# Patient Record
Sex: FEMALE | Race: WHITE | NOT HISPANIC OR LATINO | Employment: FULL TIME | URBAN - METROPOLITAN AREA
[De-identification: names, ages, dates, MRNs, and addresses within clinical notes are randomized per-mention and may not be internally consistent; named-entity substitution may affect disease eponyms.]

---

## 2024-04-02 NOTE — PROGRESS NOTES
"Chief Complaint  Varicose Veins    Subjective        Brooklyn Mantilla presents to [facility] VASCULAR SURGERY  History of Present Illness the patient is a 63-year-old female with venous insufficiency and varicose veins of the left leg for several years.  She has had injection sclerotherapy in 2018.  She had laser ablation of the left greater saphenous vein on February 13, 2024, by Dr. Fontenot.  Her initial postoperative scan demonstrated successful ablation with no DVT.  She returned on April 3, 2024 with her 6-week follow-up scan.  Scan demonstrated continued ablation of the greater saphenous vein with no DVT.  Her left leg swelling has totally resolved.  Her varices below the knee have decompressed at least 60 to 70%, maybe more.    I am seeing her in Dr. Fontenot's absence.    Past History:  Medical History: has a past medical history of Arthritis, Atrial fibrillation, Back pain, Cancer, Hypertension, and Palpitations.   Surgical History: has a past surgical history that includes Carpal tunnel release; Other surgical history; Colposcopy (04/07/2023); and Colonoscopy.   Family History: family history includes Cancer in her father and mother; Colon cancer in her maternal grandmother; Heart attack (age of onset: 72) in her paternal grandfather; Heart disease (age of onset: 70) in her maternal uncle; Hypertension in her daughter, maternal uncle, and mother; Liver cancer in her father; Multiple sclerosis in her father; Osteoarthritis in her mother; Stroke in her maternal grandmother.   Social History: reports that she has never smoked. She has never used smokeless tobacco. She reports current alcohol use. She reports that she does not use drugs.    (Not in a hospital admission)     Allergies: Patient has no known allergies.   Objective   Vital Signs:  /67   Pulse 60   Ht 175.3 cm (69\")   Wt 68 kg (150 lb)   BMI 22.15 kg/m²   Estimated body mass index is 22.15 kg/m² as calculated from the " "following:    Height as of this encounter: 175.3 cm (69\").    Weight as of this encounter: 68 kg (150 lb).       BMI is within normal parameters. No other follow-up for BMI required.      Physical Exam  Constitutional:       Appearance: Normal appearance.   HENT:      Head: Normocephalic and atraumatic.   Eyes:      Extraocular Movements: Extraocular movements intact.      Pupils: Pupils are equal, round, and reactive to light.   Cardiovascular:      Rate and Rhythm: Normal rate and regular rhythm.   Pulmonary:      Effort: Pulmonary effort is normal.      Breath sounds: Normal breath sounds.   Abdominal:      General: Bowel sounds are normal.      Palpations: Abdomen is soft.   Musculoskeletal:         General: Normal range of motion.      Cervical back: Normal range of motion and neck supple.   Lymphadenopathy:      Cervical: No cervical adenopathy.   Skin:     General: Skin is warm and dry.   Neurological:      General: No focal deficit present.      Mental Status: She is alert.   Psychiatric:         Mood and Affect: Mood normal.         Behavior: Behavior normal.        Result Review :    The following data was reviewed by: Zenia Bueno Jr., MD on 04/03/2024:    Data reviewed : Venous scan of the left leg performed on April 3, 2024, findings as described above.             Assessment and Plan     Diagnoses and all orders for this visit:    1. Varicose veins of leg with pain, left (Primary)    2. Venous (peripheral) insufficiency    She has had a very nice result from her greater saphenous vein ablation, with significant improvement in her swelling, leg heaviness and aching, and marked decompression of her varices.  It is very likely that she will have further decompression with more time.  Maximum medical benefit is generally 4 months or so from her procedures so she should see more improvement.  Will arrange follow-up with Dr. Fontenot in 10 weeks.         Follow Up     Return in about 10 weeks (around " 6/12/2024) for With Dr. Fontenot..  Patient was given instructions and counseling regarding her condition or for health maintenance advice. Please see specific information pulled into the AVS if appropriate.

## 2024-04-03 ENCOUNTER — HOSPITAL ENCOUNTER (OUTPATIENT)
Facility: HOSPITAL | Age: 64
Discharge: HOME | End: 2024-04-03
Admitting: SURGERY
Payer: MEDICARE

## 2024-04-03 ENCOUNTER — OFFICE VISIT (OUTPATIENT)
Age: 64
End: 2024-04-03
Payer: MEDICARE

## 2024-04-03 VITALS
HEART RATE: 60 BPM | HEIGHT: 69 IN | DIASTOLIC BLOOD PRESSURE: 67 MMHG | BODY MASS INDEX: 22.22 KG/M2 | SYSTOLIC BLOOD PRESSURE: 131 MMHG | WEIGHT: 150 LBS

## 2024-04-03 DIAGNOSIS — I83.812 VARICOSE VEINS OF LEFT LOWER EXTREMITY WITH PAIN: ICD-10-CM

## 2024-04-03 DIAGNOSIS — I87.2 VENOUS (PERIPHERAL) INSUFFICIENCY: ICD-10-CM

## 2024-04-03 DIAGNOSIS — I83.812 VARICOSE VEINS OF LEG WITH PAIN, LEFT: Primary | ICD-10-CM

## 2024-04-03 LAB
BH CV LOW VAS LEFT EXTERNAL ILIAC AUGMENT: NORMAL
BH CV LOW VAS LEFT EXTERNAL ILIAC COMPRESS: NORMAL
BH CV LOW VAS LEFT GREAT SAPH AK CM FIELD: 1.09 CM
BH CV LOW VAS LEFT GREATER SAPH AK VESSEL: 1
BH CV LOW VAS LEFT GREATER SAPH BK VESSEL: 1
BH CV LOWER VASCULAR LEFT COMMON FEMORAL AUGMENT: NORMAL
BH CV LOWER VASCULAR LEFT COMMON FEMORAL COMPETENT: NORMAL
BH CV LOWER VASCULAR LEFT COMMON FEMORAL COMPRESS: NORMAL
BH CV LOWER VASCULAR LEFT COMMON FEMORAL PHASIC: NORMAL
BH CV LOWER VASCULAR LEFT COMMON FEMORAL SPONT: NORMAL
BH CV LOWER VASCULAR LEFT DISTAL FEMORAL COMPRESS: NORMAL
BH CV LOWER VASCULAR LEFT EXTERNAL ILIAC COMPETENT: NORMAL
BH CV LOWER VASCULAR LEFT EXTERNAL ILIAC PHASIC: NORMAL
BH CV LOWER VASCULAR LEFT EXTERNAL ILIAC SPONT: NORMAL
BH CV LOWER VASCULAR LEFT GASTRONEMIUS COMPRESS: NORMAL
BH CV LOWER VASCULAR LEFT GREATER SAPH AK COMPRESS: NORMAL
BH CV LOWER VASCULAR LEFT GREATER SAPH BK COMPRESS: NORMAL
BH CV LOWER VASCULAR LEFT LESSER SAPH COMPRESS: NORMAL
BH CV LOWER VASCULAR LEFT MID FEMORAL AUGMENT: NORMAL
BH CV LOWER VASCULAR LEFT MID FEMORAL COMPETENT: NORMAL
BH CV LOWER VASCULAR LEFT MID FEMORAL COMPRESS: NORMAL
BH CV LOWER VASCULAR LEFT MID FEMORAL PHASIC: NORMAL
BH CV LOWER VASCULAR LEFT MID FEMORAL SPONT: NORMAL
BH CV LOWER VASCULAR LEFT PERONEAL COMPRESS: NORMAL
BH CV LOWER VASCULAR LEFT POPLITEAL AUGMENT: NORMAL
BH CV LOWER VASCULAR LEFT POPLITEAL COMPETENT: NORMAL
BH CV LOWER VASCULAR LEFT POPLITEAL COMPRESS: NORMAL
BH CV LOWER VASCULAR LEFT POPLITEAL PHASIC: NORMAL
BH CV LOWER VASCULAR LEFT POPLITEAL SPONT: NORMAL
BH CV LOWER VASCULAR LEFT POSTERIOR TIBIAL COMPRESS: NORMAL
BH CV LOWER VASCULAR LEFT PROFUNDA FEMORAL COMPRESS: NORMAL
BH CV LOWER VASCULAR LEFT PROXIMAL FEMORAL COMPRESS: NORMAL
BH CV LOWER VASCULAR LEFT SAPHENOFEMORAL JUNCTION COMPRESS: NORMAL
BH CV LOWER VASCULAR LEFT SOLEAL COMPRESS: NORMAL
BH CV LOWER VASCULAR RIGHT COMMON FEMORAL AUGMENT: NORMAL
BH CV LOWER VASCULAR RIGHT COMMON FEMORAL COMPETENT: NORMAL
BH CV LOWER VASCULAR RIGHT COMMON FEMORAL COMPRESS: NORMAL
BH CV LOWER VASCULAR RIGHT COMMON FEMORAL PHASIC: NORMAL
BH CV LOWER VASCULAR RIGHT COMMON FEMORAL SPONT: NORMAL

## 2024-04-03 PROCEDURE — 93971 EXTREMITY STUDY: CPT

## 2024-04-10 RX ORDER — PROGESTERONE 100 MG/1
100 CAPSULE ORAL DAILY
Qty: 30 CAPSULE | Refills: 1 | Status: SHIPPED | OUTPATIENT
Start: 2024-04-10

## 2024-04-25 DIAGNOSIS — I10 ESSENTIAL HYPERTENSION: Primary | ICD-10-CM

## 2024-04-25 DIAGNOSIS — Z00.00 HEALTHCARE MAINTENANCE: ICD-10-CM

## 2024-05-01 LAB
ALBUMIN SERPL-MCNC: 4.4 G/DL (ref 3.5–5.2)
ALBUMIN/GLOB SERPL: 2.2 G/DL
ALP SERPL-CCNC: 43 U/L (ref 39–117)
ALT SERPL-CCNC: 24 U/L (ref 1–33)
APPEARANCE UR: CLEAR
AST SERPL-CCNC: 29 U/L (ref 1–32)
BACTERIA #/AREA URNS HPF: ABNORMAL /[HPF]
BACTERIA UR CULT: NO GROWTH
BACTERIA UR CULT: NORMAL
BASOPHILS # BLD AUTO: 0.05 10*3/MM3 (ref 0–0.2)
BASOPHILS NFR BLD AUTO: 1.1 % (ref 0–1.5)
BILIRUB SERPL-MCNC: 0.7 MG/DL (ref 0–1.2)
BILIRUB UR QL STRIP: NEGATIVE
BUN SERPL-MCNC: 14 MG/DL (ref 8–23)
BUN/CREAT SERPL: 16.1 (ref 7–25)
CALCIUM SERPL-MCNC: 9.9 MG/DL (ref 8.6–10.5)
CASTS URNS QL MICRO: ABNORMAL /LPF
CHLORIDE SERPL-SCNC: 102 MMOL/L (ref 98–107)
CHOLEST SERPL-MCNC: 215 MG/DL (ref 0–200)
CO2 SERPL-SCNC: 24.9 MMOL/L (ref 22–29)
COLOR UR: YELLOW
CREAT SERPL-MCNC: 0.87 MG/DL (ref 0.57–1)
EGFRCR SERPLBLD CKD-EPI 2021: 74 ML/MIN/1.73
EOSINOPHIL # BLD AUTO: 0.18 10*3/MM3 (ref 0–0.4)
EOSINOPHIL NFR BLD AUTO: 4.1 % (ref 0.3–6.2)
EPI CELLS #/AREA URNS HPF: ABNORMAL /HPF (ref 0–10)
ERYTHROCYTE [DISTWIDTH] IN BLOOD BY AUTOMATED COUNT: 12.5 % (ref 12.3–15.4)
GLOBULIN SER CALC-MCNC: 2 GM/DL
GLUCOSE SERPL-MCNC: 87 MG/DL (ref 65–99)
GLUCOSE UR QL STRIP: NEGATIVE
HCT VFR BLD AUTO: 38.8 % (ref 34–46.6)
HDLC SERPL-MCNC: 81 MG/DL (ref 40–60)
HGB BLD-MCNC: 12.9 G/DL (ref 12–15.9)
HGB UR QL STRIP: NEGATIVE
IMM GRANULOCYTES # BLD AUTO: 0.01 10*3/MM3 (ref 0–0.05)
IMM GRANULOCYTES NFR BLD AUTO: 0.2 % (ref 0–0.5)
KETONES UR QL STRIP: NEGATIVE
LDLC SERPL CALC-MCNC: 123 MG/DL (ref 0–100)
LEUKOCYTE ESTERASE UR QL STRIP: ABNORMAL
LYMPHOCYTES # BLD AUTO: 2.06 10*3/MM3 (ref 0.7–3.1)
LYMPHOCYTES NFR BLD AUTO: 46.8 % (ref 19.6–45.3)
MCH RBC QN AUTO: 31.9 PG (ref 26.6–33)
MCHC RBC AUTO-ENTMCNC: 33.2 G/DL (ref 31.5–35.7)
MCV RBC AUTO: 95.8 FL (ref 79–97)
MICRO URNS: ABNORMAL
MONOCYTES # BLD AUTO: 0.44 10*3/MM3 (ref 0.1–0.9)
MONOCYTES NFR BLD AUTO: 10 % (ref 5–12)
NEUTROPHILS # BLD AUTO: 1.66 10*3/MM3 (ref 1.7–7)
NEUTROPHILS NFR BLD AUTO: 37.8 % (ref 42.7–76)
NITRITE UR QL STRIP: NEGATIVE
NRBC BLD AUTO-RTO: 0 /100 WBC (ref 0–0.2)
PH UR STRIP: 6.5 [PH] (ref 5–7.5)
PLATELET # BLD AUTO: 262 10*3/MM3 (ref 140–450)
POTASSIUM SERPL-SCNC: 4.8 MMOL/L (ref 3.5–5.2)
PROT SERPL-MCNC: 6.4 G/DL (ref 6–8.5)
PROT UR QL STRIP: NEGATIVE
RBC # BLD AUTO: 4.05 10*6/MM3 (ref 3.77–5.28)
RBC #/AREA URNS HPF: ABNORMAL /HPF (ref 0–2)
SODIUM SERPL-SCNC: 138 MMOL/L (ref 136–145)
SP GR UR STRIP: 1.01 (ref 1–1.03)
TRIGL SERPL-MCNC: 61 MG/DL (ref 0–150)
TSH SERPL DL<=0.005 MIU/L-ACNC: 2.07 UIU/ML (ref 0.27–4.2)
URINALYSIS REFLEX: ABNORMAL
UROBILINOGEN UR STRIP-MCNC: 0.2 MG/DL (ref 0.2–1)
VLDLC SERPL CALC-MCNC: 11 MG/DL (ref 5–40)
WBC # BLD AUTO: 4.4 10*3/MM3 (ref 3.4–10.8)
WBC #/AREA URNS HPF: ABNORMAL /HPF (ref 0–5)

## 2024-05-06 ENCOUNTER — OFFICE VISIT (OUTPATIENT)
Dept: INTERNAL MEDICINE | Facility: CLINIC | Age: 64
End: 2024-05-06
Payer: MEDICARE

## 2024-05-06 VITALS
HEIGHT: 69 IN | DIASTOLIC BLOOD PRESSURE: 54 MMHG | BODY MASS INDEX: 22.07 KG/M2 | SYSTOLIC BLOOD PRESSURE: 112 MMHG | OXYGEN SATURATION: 98 % | HEART RATE: 56 BPM | WEIGHT: 149 LBS

## 2024-05-06 DIAGNOSIS — I48.0 PAROXYSMAL ATRIAL FIBRILLATION: ICD-10-CM

## 2024-05-06 DIAGNOSIS — Z12.31 ENCOUNTER FOR SCREENING MAMMOGRAM FOR BREAST CANCER: ICD-10-CM

## 2024-05-06 DIAGNOSIS — I10 ESSENTIAL HYPERTENSION: ICD-10-CM

## 2024-05-06 DIAGNOSIS — R53.83 FATIGUE, UNSPECIFIED TYPE: ICD-10-CM

## 2024-05-06 DIAGNOSIS — F41.9 ANXIETY: ICD-10-CM

## 2024-05-06 DIAGNOSIS — Z00.00 HEALTHCARE MAINTENANCE: Primary | ICD-10-CM

## 2024-05-06 RX ORDER — ASPIRIN 81 MG/1
81 TABLET ORAL DAILY
Start: 2024-05-06

## 2024-05-06 RX ORDER — ESTRADIOL 0.04 MG/D
1 FILM, EXTENDED RELEASE TRANSDERMAL 2 TIMES WEEKLY
Qty: 8 EACH | Refills: 1 | Status: SHIPPED | OUTPATIENT
Start: 2024-05-06

## 2024-06-18 ENCOUNTER — TELEPHONE (OUTPATIENT)
Dept: INTERNAL MEDICINE | Facility: CLINIC | Age: 64
End: 2024-06-18
Payer: MEDICARE

## 2024-06-18 NOTE — TELEPHONE ENCOUNTER
Caller: Brooklyn Mantilla     Relationship: SELF    Best call back number: [phone]       What is your medical concern?     PATIENT WAS SCHEDULED TO GET A MAMMOGRAM ON 6/17/2024 AT [facility] AND THEY TOLD HER THAT THEY DO NOT TAKE HER INSURANCE    SHE CONTACTED HER INSURANCE COMPANY AND THEY TOLD HER THAT DR AWAN COULD FILE A CONTINUANCE OF CARE ON BEHALF OF THE PATIENT AND IT WOULD COVER THE MAMMOGRAM.    THE PROVIDER  NUMBER WITH HER INSURANCE -[id number].    ONCE THIS HAS BEEN APPROVED THEN PATIENT HAS TO RESCHEDULE HER MAMMOGRAM.      PLEASE ADVISE

## 2024-07-08 RX ORDER — PROGESTERONE 100 MG/1
100 CAPSULE ORAL DAILY
Qty: 90 CAPSULE | Refills: 1 | Status: SHIPPED | OUTPATIENT
Start: 2024-07-08

## 2024-07-15 RX ORDER — ESTRADIOL 0.04 MG/D
1 PATCH, EXTENDED RELEASE TRANSDERMAL 2 TIMES WEEKLY
Qty: 8 EACH | Refills: 1 | Status: SHIPPED | OUTPATIENT
Start: 2024-07-15

## 2024-08-16 RX ORDER — VORTIOXETINE 20 MG/1
1 TABLET, FILM COATED ORAL DAILY
Qty: 90 TABLET | Refills: 3 | Status: SHIPPED | OUTPATIENT
Start: 2024-08-16

## 2024-08-16 RX ORDER — CONJUGATED ESTROGENS 0.62 MG/G
1 CREAM VAGINAL
Qty: 30 G | Refills: 0 | Status: SHIPPED | OUTPATIENT
Start: 2024-08-16

## 2024-08-29 RX ORDER — ESTRADIOL 0.04 MG/D
1 PATCH, EXTENDED RELEASE TRANSDERMAL 2 TIMES WEEKLY
Qty: 8 EACH | Refills: 1 | Status: SHIPPED | OUTPATIENT
Start: 2024-08-29

## 2024-09-03 RX ORDER — ESTRADIOL 0.04 MG/D
1 PATCH, EXTENDED RELEASE TRANSDERMAL 2 TIMES WEEKLY
Qty: 24 EACH | Refills: 1 | Status: SHIPPED | OUTPATIENT
Start: 2024-09-05

## 2024-11-12 ENCOUNTER — TELEPHONE (OUTPATIENT)
Dept: INTERNAL MEDICINE | Facility: CLINIC | Age: 64
End: 2024-11-12
Payer: MEDICARE

## 2024-11-12 RX ORDER — METOPROLOL TARTRATE 25 MG/1
25 TABLET, FILM COATED ORAL 2 TIMES DAILY
Qty: 180 TABLET | Refills: 1 | Status: SHIPPED | OUTPATIENT
Start: 2024-11-12 | End: 2024-11-18 | Stop reason: SDUPTHER

## 2024-11-12 NOTE — TELEPHONE ENCOUNTER
Caller: Jose Rafael Brooklyn    Relationship: Self    Best call back number: [phone]     Requested Prescriptions:   Requested Prescriptions     Pending Prescriptions Disp Refills    metoprolol tartrate (LOPRESSOR) 25 MG tablet 90 tablet 3        Pharmacy where request should be sent: [facility]/PHARMACY #[id number] [facility] [address] AT [address] & [facility] [phone]  - [phone] FX     Last office visit with prescribing clinician: 5/6/2024   Last telemedicine visit with prescribing clinician: Visit date not found   Next office visit with prescribing clinician: 12/6/2024     Additional details provided by patient:     Does the patient have less than a 3 day supply:  [] Yes  [x] No    Would you like a call back once the refill request has been completed: [] Yes [] No    If the office needs to give you a call back, can they leave a voicemail: [] Yes [] No    Rangel Simmons Rep   11/12/24 15:12 EST

## 2024-11-12 NOTE — TELEPHONE ENCOUNTER
Patient request refill for:    metoprolol tartrate (LOPRESSOR) 25 MG tablet     Pharmacy -     [facility]/pharmacy #[id number] - [place], [place] - [address] AT [address]AR [address] - [phone] [facility] [phone] FX     If you have any questions you can call her at [phone].

## 2024-11-14 RX ORDER — OLMESARTAN MEDOXOMIL 40 MG/1
40 TABLET ORAL DAILY
Qty: 90 TABLET | Refills: 3 | Status: SHIPPED | OUTPATIENT
Start: 2024-11-14

## 2024-11-18 RX ORDER — METOPROLOL TARTRATE 25 MG/1
25 TABLET, FILM COATED ORAL 2 TIMES DAILY
Qty: 180 TABLET | Refills: 3 | Status: SHIPPED | OUTPATIENT
Start: 2024-11-18

## 2024-11-21 RX ORDER — HYDROCHLOROTHIAZIDE 25 MG/1
25 TABLET ORAL DAILY
Qty: 90 TABLET | Refills: 3 | Status: SHIPPED | OUTPATIENT
Start: 2024-11-21

## 2024-12-02 DIAGNOSIS — I10 ESSENTIAL HYPERTENSION: Primary | ICD-10-CM

## 2024-12-02 DIAGNOSIS — I48.0 PAROXYSMAL ATRIAL FIBRILLATION: ICD-10-CM

## 2024-12-04 LAB
ALBUMIN SERPL-MCNC: 4.2 G/DL (ref 3.9–4.9)
ALP SERPL-CCNC: 52 IU/L (ref 44–121)
ALT SERPL-CCNC: 30 IU/L (ref 0–32)
APPEARANCE UR: CLEAR
AST SERPL-CCNC: 29 IU/L (ref 0–40)
BACTERIA #/AREA URNS HPF: NORMAL /[HPF]
BACTERIA UR CULT: NORMAL
BACTERIA UR CULT: NORMAL
BASOPHILS # BLD AUTO: 0.1 X10E3/UL (ref 0–0.2)
BASOPHILS NFR BLD AUTO: 1 %
BILIRUB SERPL-MCNC: 0.5 MG/DL (ref 0–1.2)
BILIRUB UR QL STRIP: NEGATIVE
BUN SERPL-MCNC: 14 MG/DL (ref 8–27)
BUN/CREAT SERPL: 18 (ref 12–28)
CALCIUM SERPL-MCNC: 9.7 MG/DL (ref 8.7–10.3)
CASTS URNS QL MICRO: NORMAL /LPF
CHLORIDE SERPL-SCNC: 99 MMOL/L (ref 96–106)
CHOLEST SERPL-MCNC: 204 MG/DL (ref 100–199)
CO2 SERPL-SCNC: 22 MMOL/L (ref 20–29)
COLOR UR: YELLOW
CREAT SERPL-MCNC: 0.79 MG/DL (ref 0.57–1)
EGFRCR SERPLBLD CKD-EPI 2021: 82 ML/MIN/1.73
EOSINOPHIL # BLD AUTO: 0.1 X10E3/UL (ref 0–0.4)
EOSINOPHIL NFR BLD AUTO: 2 %
EPI CELLS #/AREA URNS HPF: NORMAL /HPF (ref 0–10)
ERYTHROCYTE [DISTWIDTH] IN BLOOD BY AUTOMATED COUNT: 11.8 % (ref 11.7–15.4)
GLOBULIN SER CALC-MCNC: 2.3 G/DL (ref 1.5–4.5)
GLUCOSE SERPL-MCNC: 91 MG/DL (ref 70–99)
GLUCOSE UR QL STRIP: NEGATIVE
HCT VFR BLD AUTO: 42.4 % (ref 34–46.6)
HDLC SERPL-MCNC: 74 MG/DL
HGB BLD-MCNC: 13.8 G/DL (ref 11.1–15.9)
HGB UR QL STRIP: NEGATIVE
IMM GRANULOCYTES # BLD AUTO: 0 X10E3/UL (ref 0–0.1)
IMM GRANULOCYTES NFR BLD AUTO: 0 %
KETONES UR QL STRIP: NEGATIVE
LDLC SERPL CALC-MCNC: 115 MG/DL (ref 0–99)
LDLC/HDLC SERPL: 1.6 RATIO (ref 0–3.2)
LEUKOCYTE ESTERASE UR QL STRIP: ABNORMAL
LYMPHOCYTES # BLD AUTO: 2 X10E3/UL (ref 0.7–3.1)
LYMPHOCYTES NFR BLD AUTO: 33 %
MCH RBC QN AUTO: 31.2 PG (ref 26.6–33)
MCHC RBC AUTO-ENTMCNC: 32.5 G/DL (ref 31.5–35.7)
MCV RBC AUTO: 96 FL (ref 79–97)
MICRO URNS: ABNORMAL
MONOCYTES # BLD AUTO: 0.6 X10E3/UL (ref 0.1–0.9)
MONOCYTES NFR BLD AUTO: 9 %
NEUTROPHILS # BLD AUTO: 3.4 X10E3/UL (ref 1.4–7)
NEUTROPHILS NFR BLD AUTO: 55 %
NITRITE UR QL STRIP: NEGATIVE
PH UR STRIP: 6 [PH] (ref 5–7.5)
PLATELET # BLD AUTO: 372 X10E3/UL (ref 150–450)
POTASSIUM SERPL-SCNC: 4.9 MMOL/L (ref 3.5–5.2)
PROT SERPL-MCNC: 6.5 G/DL (ref 6–8.5)
PROT UR QL STRIP: NEGATIVE
RBC # BLD AUTO: 4.43 X10E6/UL (ref 3.77–5.28)
RBC #/AREA URNS HPF: NORMAL /HPF (ref 0–2)
SODIUM SERPL-SCNC: 135 MMOL/L (ref 134–144)
SP GR UR STRIP: 1.01 (ref 1–1.03)
TRIGL SERPL-MCNC: 82 MG/DL (ref 0–149)
TSH SERPL DL<=0.005 MIU/L-ACNC: 2.14 UIU/ML (ref 0.45–4.5)
URINALYSIS REFLEX: ABNORMAL
UROBILINOGEN UR STRIP-MCNC: 0.2 MG/DL (ref 0.2–1)
VLDLC SERPL CALC-MCNC: 15 MG/DL (ref 5–40)
WBC # BLD AUTO: 6.3 X10E3/UL (ref 3.4–10.8)
WBC #/AREA URNS HPF: NORMAL /HPF (ref 0–5)

## 2024-12-05 RX ORDER — HYDROCHLOROTHIAZIDE 25 MG/1
25 TABLET ORAL DAILY
Qty: 90 TABLET | Refills: 3 | Status: SHIPPED | OUTPATIENT
Start: 2024-12-05 | End: 2024-12-06 | Stop reason: DRUGHIGH

## 2024-12-05 RX ORDER — OLMESARTAN MEDOXOMIL 40 MG/1
40 TABLET ORAL DAILY
Qty: 90 TABLET | Refills: 3 | Status: SHIPPED | OUTPATIENT
Start: 2024-12-05

## 2024-12-06 ENCOUNTER — OFFICE VISIT (OUTPATIENT)
Dept: INTERNAL MEDICINE | Facility: CLINIC | Age: 64
End: 2024-12-06
Payer: MEDICARE

## 2024-12-06 VITALS
OXYGEN SATURATION: 99 % | SYSTOLIC BLOOD PRESSURE: 146 MMHG | HEART RATE: 52 BPM | HEIGHT: 69 IN | BODY MASS INDEX: 21.92 KG/M2 | WEIGHT: 148 LBS | DIASTOLIC BLOOD PRESSURE: 72 MMHG

## 2024-12-06 DIAGNOSIS — N93.9 UTERINE BLEEDING: Primary | ICD-10-CM

## 2024-12-06 DIAGNOSIS — I10 HYPERTENSION, UNSPECIFIED TYPE: ICD-10-CM

## 2024-12-06 DIAGNOSIS — N95.1 HOT FLASH, MENOPAUSAL: ICD-10-CM

## 2024-12-06 PROCEDURE — 1125F AMNT PAIN NOTED PAIN PRSNT: CPT | Performed by: INTERNAL MEDICINE

## 2024-12-06 PROCEDURE — 3077F SYST BP >= 140 MM HG: CPT | Performed by: INTERNAL MEDICINE

## 2024-12-06 PROCEDURE — 99214 OFFICE O/P EST MOD 30 MIN: CPT | Performed by: INTERNAL MEDICINE

## 2024-12-06 PROCEDURE — 3078F DIAST BP <80 MM HG: CPT | Performed by: INTERNAL MEDICINE

## 2024-12-06 RX ORDER — VORTIOXETINE 20 MG/1
1 TABLET, FILM COATED ORAL DAILY
Qty: 90 TABLET | Refills: 3 | Status: SHIPPED | OUTPATIENT
Start: 2024-12-06

## 2024-12-06 RX ORDER — HYDROCHLOROTHIAZIDE 12.5 MG/1
12.5 TABLET ORAL DAILY
Qty: 90 TABLET | Refills: 1 | Status: SHIPPED | OUTPATIENT
Start: 2024-12-06

## 2024-12-06 NOTE — PROGRESS NOTES
Chief Complaint   Patient presents with    Hypertension    hot flash/ menopausal symptoms       Hypertension       Brooklyn Mantilla is a 64 y.o. female presents for ER follow up. She was having post menopausal bleeding. She was advised to stop hrt. She is no longer bleeding. She is now having hot flash. She was rx med (maybe veozah) but is cost prohibitive.  She notes mood is stable w trintellix.   Bp elevated today. Patient has reduced hctz to qod or less. Taking ibuprofen for shoulder pain. Had injection 3 months ago w transient benefit. Returned to ortho and received second injection w improvement.     The following portions of the patient's history were reviewed and updated as appropriate: allergies, current medications, past family history, past medical history, past social history, past surgical history and problem list.  Current Outpatient Medications on File Prior to Visit   Medication Sig Dispense Refill    MAGNESIUM PO Take 1 tablet by mouth Daily.      metoprolol tartrate (LOPRESSOR) 25 MG tablet Take 1 tablet by mouth 2 (Two) Times a Day. 180 tablet 3    Multiple Vitamins-Minerals (MULTIVITAMIN ADULT PO) Take 1 tablet by mouth Daily.      olmesartan (BENICAR) 40 MG tablet TAKE 1 TABLET BY MOUTH EVERY DAY 90 tablet 3    Premarin 0.625 MG/GM vaginal cream INSERT 1 G INTO THE VAGINA EVERY 7 (SEVEN) DAYS. 30 g 0    [DISCONTINUED] hydroCHLOROthiazide 25 MG tablet TAKE 1 TABLET BY MOUTH EVERY DAY 90 tablet 3    [DISCONTINUED] Trintellix 20 MG tablet TAKE 1 TABLET BY MOUTH EVERY DAY 90 tablet 3    aspirin 81 MG EC tablet Take 1 tablet by mouth Daily. (Patient not taking: Reported on 12/6/2024)      estradiol (VIVELLE-DOT) 0.0375 MG/24HR patch Place 1 patch on the skin as directed by provider 2 (Two) Times a Week. (Patient not taking: Reported on 12/6/2024) 24 each 1    Progesterone (PROMETRIUM) 100 MG capsule Take 1 capsule by mouth Daily. (Patient not taking: Reported on 12/6/2024) 90 capsule 1     No current  "facility-administered medications on file prior to visit.     Review of Systems   Constitutional: Negative.    HENT: Negative.     Eyes: Negative.    Respiratory: Negative.     Cardiovascular: Negative.    Gastrointestinal: Negative.    Endocrine: Negative.    Genitourinary: Negative.    Musculoskeletal:         Right shoulder pain   Skin: Negative.    Allergic/Immunologic: Negative.    Neurological: Negative.    Hematological: Negative.    Psychiatric/Behavioral: Negative.         Objective   Physical Exam  Vitals and nursing note reviewed.   Constitutional:       Appearance: Normal appearance.   HENT:      Head: Normocephalic and atraumatic.      Right Ear: Tympanic membrane normal.      Left Ear: Tympanic membrane normal.      Nose: Nose normal.      Mouth/Throat:      Mouth: Mucous membranes are moist.   Eyes:      Extraocular Movements: Extraocular movements intact.      Pupils: Pupils are equal, round, and reactive to light.   Cardiovascular:      Rate and Rhythm: Normal rate and regular rhythm.      Pulses: Normal pulses.      Heart sounds: Normal heart sounds.   Pulmonary:      Effort: Pulmonary effort is normal.      Breath sounds: Normal breath sounds.   Abdominal:      General: Abdomen is flat.      Palpations: Abdomen is soft.   Musculoskeletal:      Cervical back: Normal range of motion.   Skin:     General: Skin is warm and dry.   Neurological:      General: No focal deficit present.      Mental Status: She is alert and oriented to person, place, and time.   Psychiatric:         Mood and Affect: Mood normal.         Behavior: Behavior normal.         Thought Content: Thought content normal.         Judgment: Judgment normal.          /72 (BP Location: Right arm, Patient Position: Sitting)   Pulse 52   Ht 174 cm (68.5\")   Wt 67.1 kg (148 lb)   SpO2 99%   BMI 22.18 kg/m²     Assessment & Plan   Diagnoses and all orders for this visit:    Uterine bleeding    Hypertension, unspecified " type    Hot flash, menopausal    Other orders  -     hydroCHLOROthiazide 12.5 MG tablet; Take 1 tablet by mouth Daily.  -     Vortioxetine HBr (Trintellix) 20 MG tablet; Take 1 tablet by mouth Daily.      Patient w uterine bleeding/ now resolved. Will remain off of hrt. She is to f/u w gyn regarding hot flash. Healthy mood on trintellix. Unfortunately, it is cost prohibitive. Will give samples today. Will also give in the future. She will try hctz 12.5 mg daily and take magnesium at night. She will stretch and hydrate well given muscle cramps. She has varicose veins. Healed well s/p varicose vein procedure. She is advised compression tights. She will f/u here in a prn fashion.

## 2025-01-31 NOTE — PROGRESS NOTES
Chief Complaint  No chief complaint on file.  Follow-up after varicose vein surgery    Kobe Mantilla presents to [facility] VASCULAR SURGERY  History of Present Illness  the patient is a 64-year-old female with venous insufficiency and varicose veins of the left leg for several years.  She has had injection sclerotherapy in 2018.  She had laser ablation of the left greater saphenous vein on February 13, 2024, by Dr. Fontenot.  Her initial postoperative scan demonstrated successful ablation with no DVT.  She returned on April 3, 2024 with her 6-week follow-up scan.  Scan demonstrated continued ablation of the greater saphenous vein with no DVT.  Her left leg swelling has totally resolved.  Her varices below the knee have decompressed at least 60 to 70%, maybe more.  She returned on February 5, 2025 for follow-up.  I have reviewed her preoperative venous duplex scan performed on December 18, 2023 (under the name of Brooklyn Cardenas) and she had multiple varices off of the greater saphenous vein below the level of her ablation.  Also noted to have a short segment of anterior accessory saphenous vein with significant reflux.       Past History:  Medical History: has a past medical history of Arthritis, Atrial fibrillation, Back pain, Cancer, Hypertension, and Palpitations.   Surgical History: has a past surgical history that includes Carpal tunnel release; Other surgical history; Colposcopy (04/07/2023); and Colonoscopy.   Family History: family history includes Cancer in her father and mother; Colon cancer in her maternal grandmother; Heart attack (age of onset: 72) in her paternal grandfather; Heart disease (age of onset: 70) in her maternal uncle; Hypertension in her daughter, maternal uncle, and mother; Liver cancer in her father; Multiple sclerosis in her father; Osteoarthritis in her mother; Stroke in her maternal grandmother.   Social History: reports that she has never smoked. She  "has never used smokeless tobacco. She reports current alcohol use. She reports that she does not use drugs.    (Not in a hospital admission)     Allergies: Patient has no known allergies.   Objective   Vital Signs:  There were no vitals taken for this visit.  Estimated body mass index is 22.18 kg/m² as calculated from the following:    Height as of 12/6/24: 174 cm (68.5\").    Weight as of 12/6/24: 67.1 kg (148 lb).     BMI is within normal parameters. No other follow-up for BMI required.    Brooklyn Mantilla  reports that she has never smoked. She has never used smokeless tobacco.       Physical Exam 2 cm varices in the anterior thigh coursing down to the mid thigh.  2 cm varices in the left medial calf to just above the ankle.  Result Review :        Data reviewed : Vein mapping from December 18, 2023 as described above.             Assessment and Plan     Diagnoses and all orders for this visit:    1. Varicose veins of leg with pain, left (Primary)    2. Venous (peripheral) insufficiency    Unfortunately, she continues to have painful varicose veins, those in the anterior thigh originating off of the anterior accessory saphenous vein and varices off of the greater saphenous vein in the calf region.  I have discussed open phlebectomies versus chemical ablation with Varithena.  The procedure and risk and benefits of each of these were discussed.  Risk include but not limited to bleeding, infection, anesthetic risk with the open procedure, DVT, pulmonary embolism, even death.  Even though these risks are small they are real.  She would like to proceed with chemical ablation.  We will contact insurance for approval.         Follow Up     No follow-ups on file.  Patient was given instructions and counseling regarding her condition or for health maintenance advice. Please see specific information pulled into the AVS if appropriate.       "

## 2025-02-05 ENCOUNTER — OFFICE VISIT (OUTPATIENT)
Age: 65
End: 2025-02-05
Payer: MEDICARE

## 2025-02-05 VITALS
HEIGHT: 69 IN | BODY MASS INDEX: 21.77 KG/M2 | HEART RATE: 78 BPM | WEIGHT: 147 LBS | SYSTOLIC BLOOD PRESSURE: 141 MMHG | DIASTOLIC BLOOD PRESSURE: 88 MMHG

## 2025-02-05 DIAGNOSIS — I83.812 VARICOSE VEINS OF LEG WITH PAIN, LEFT: Primary | ICD-10-CM

## 2025-02-05 DIAGNOSIS — I87.2 VENOUS (PERIPHERAL) INSUFFICIENCY: ICD-10-CM

## 2025-02-13 RX ORDER — HYDROCHLOROTHIAZIDE 12.5 MG/1
12.5 TABLET ORAL DAILY
Qty: 100 TABLET | Refills: 2 | Status: SHIPPED | OUTPATIENT
Start: 2025-02-13

## 2025-03-07 ENCOUNTER — TELEPHONE (OUTPATIENT)
Dept: INTERNAL MEDICINE | Facility: CLINIC | Age: 65
End: 2025-03-07
Payer: MEDICARE

## 2025-03-07 NOTE — TELEPHONE ENCOUNTER
Caller: Brooklyn Mantilla    Relationship: Self    Best call back number: [phone]     Which medication are you concerned about:     Vortioxetine HBr (Trintellix) 20 MG tablet       Who prescribed you this medication: DR. AWAN        What are your concerns: MEDICATION HAS GONE UP TO OVER $500 A MONTH.  SHE CAN'T DO A 3 MONTH SUPPLY SO SHE HAS TO PAY THAT EVERY MONTH.  MEDICARE WILL NOT ALLOW TO USE A COUPON AND GOOD RX IS ABOUT THE SAME AS THE $500.  PATIENT CAN'T AFFORD THIS MEDICATION ANY LONGER.  IS THERE ANYTHING TO REPLACE THIS?  SHE HAS 6 DAYS LEFT. PLEASE CALL TO ADVISE.

## 2025-03-09 RX ORDER — VILAZODONE HYDROCHLORIDE 20 MG/1
20 TABLET ORAL DAILY
Qty: 30 TABLET | Refills: 5 | Status: SHIPPED | OUTPATIENT
Start: 2025-03-09

## 2025-03-10 NOTE — TELEPHONE ENCOUNTER
Hub staff attempted to follow warm transfer process and was unsuccessful     Caller: Brooklyn Mantilla    Relationship to patient: Self    Best call back number: [phone]     Patient is needing: PATIENT RETURNING CALL PLACED TO HER TO DISCUSS THIS. PLEASE CALL BACK AT EARLIEST CONVENIENCE.

## 2025-03-10 NOTE — TELEPHONE ENCOUNTER
Called and spoke with patient.   She got our message, she will check the price of the new medicine and check.   Her insurance did tell her, it would cost $500 for one month of Trintillix and then $100 a month after that.   But she will try this new medicine if it's not expensive.

## 2025-04-07 RX ORDER — VILAZODONE HYDROCHLORIDE 20 MG/1
20 TABLET ORAL DAILY
Qty: 90 TABLET | Refills: 3 | Status: SHIPPED | OUTPATIENT
Start: 2025-04-07

## 2025-04-28 NOTE — PROGRESS NOTES
Chief Complaint  Varicose Veins (Post op)    Subjective        Brooklyn Mantilla presents to [facility] VASCULAR SURGERY  HPI   Brooklyn Mantilla is a 64 y.o. female that has been followed in our office for venous insufficiency and varicose veins. She under went a Varithena chemical ablation of left proximal great saphenous vein, proximal anterior saphenous vein, small saphenous vein tributary/extension varicose veins off of the truncal veins on the left lower extremity on 4/25/2025 with Dr. Bueno. She returns today in follow up along with a spot check.  Scan shows successful ablation of the greater saphenous vein, anterior saphenous vein and short saphenous vein with tail of thrombus extending from the saphenofemoral junction into the common femoral vein.  She has been doing well since the procedure with minimal pain. She has been compliant with compression stockings.    Brooklyn Mantilla  reports that she has never smoked. She has never been exposed to tobacco smoke. She has never used smokeless tobacco..        Objective   Vital Signs:  Vitals:    04/29/25 1445   BP: 112/62      Body mass index is 22.02 kg/m².   BMI is within normal parameters. No other follow-up for BMI required.       Physical Exam  Constitutional:       Appearance: Normal appearance.   HENT:      Head: Normocephalic.   Eyes:      Pupils: Pupils are equal, round, and reactive to light.   Cardiovascular:      Rate and Rhythm: Normal rate and regular rhythm.      Pulses:           Dorsalis pedis pulses are 2+ on the left side.        Posterior tibial pulses are 2+ on the left side.   Pulmonary:      Effort: Pulmonary effort is normal.      Breath sounds: Normal breath sounds.   Abdominal:      General: Abdomen is flat.      Palpations: Abdomen is soft.   Musculoskeletal:         General: Normal range of motion.      Right lower leg: No edema.      Left lower leg: No edema.   Skin:     Capillary Refill: Capillary refill takes less than 2  seconds.   Neurological:      Mental Status: She is alert.   Psychiatric:         Mood and Affect: Mood normal.         Behavior: Behavior normal.          Result Review :      Spot Check:   Duplex Venous Lower Extremity - Left CAR (04/29/2025 14:34)                  Assessment and Plan     Diagnoses and all orders for this visit:    1. Venous (peripheral) insufficiency (Primary)  -     Duplex Venous Lower Extremity - Left CAR; Future    2. Varicose veins of left lower extremity with pain  -     Duplex Venous Lower Extremity - Left CAR; Future              Patient is doing well status post Varithena chemical ablation of the left proximal great saphenous vein, proximal anterior saphenous vein, small saphenous vein and tributary/extension varicose veins of the truncal veins in the left lower extremity.  Today scan support successful ablation of the greater saphenous vein, anterior saphenous vein and short saphenous vein.  However, there is a Vernon thrombus extending from the saphenofemoral femoral junction into the common femoral vein.  Therefore, patient was placed on Eliquis 5 mg twice daily x 2 weeks.  She will return in 2 weeks and have venous scan to assess the tail thrombus.  We discussed that it will take time for these veins to decompress.  We discussed postoperative care instructions including wearing stockings for a total of 2 weeks. Shemay resume exercise in 2 weeks.      Follow Up     Return in about 2 weeks (around 5/13/2025) for LEV LLE and follow up .      BINDU Case

## 2025-04-29 ENCOUNTER — HOSPITAL ENCOUNTER (OUTPATIENT)
Facility: HOSPITAL | Age: 65
Discharge: HOME | End: 2025-04-29
Admitting: SURGERY
Payer: MEDICARE

## 2025-04-29 ENCOUNTER — OFFICE VISIT (OUTPATIENT)
Age: 65
End: 2025-04-29
Payer: MEDICARE

## 2025-04-29 VITALS
WEIGHT: 147 LBS | BODY MASS INDEX: 21.77 KG/M2 | DIASTOLIC BLOOD PRESSURE: 62 MMHG | SYSTOLIC BLOOD PRESSURE: 112 MMHG | HEIGHT: 69 IN

## 2025-04-29 DIAGNOSIS — I87.2 VENOUS (PERIPHERAL) INSUFFICIENCY: Primary | ICD-10-CM

## 2025-04-29 DIAGNOSIS — I83.812 VARICOSE VEINS OF LEG WITH PAIN, LEFT: ICD-10-CM

## 2025-04-29 DIAGNOSIS — I87.2 VENOUS (PERIPHERAL) INSUFFICIENCY: ICD-10-CM

## 2025-04-29 DIAGNOSIS — I83.812 VARICOSE VEINS OF LEFT LOWER EXTREMITY WITH PAIN: ICD-10-CM

## 2025-04-29 LAB
BH CV LOW VAS LEFT EXTERNAL ILIAC COMPRESS: NORMAL
BH CV LOW VAS LEFT EXTERNAL ILIAC SPONT: 1
BH CV LOW VAS LEFT GASTRONEMIUS VESSEL: 1
BH CV LOW VAS LEFT GREATER SAPH AK VESSEL: 1
BH CV LOW VAS LEFT LESSER SAPH VESSEL: 1
BH CV LOW VAS LEFT SAPHENOFEMORAL JUNCTION SPONT: 1
BH CV LOW VAS LEFT VARICOSITY AK VESSEL: 1
BH CV LOW VAS LEFT VARICOSITY BK VESSEL: 1
BH CV LOWER VASCULAR LEFT ANTERIOR SAPH COMPRESS: NORMAL
BH CV LOWER VASCULAR LEFT ANTERIOR SAPH VESSEL SCRIPT: 1
BH CV LOWER VASCULAR LEFT COMMON FEMORAL COMPRESS: NORMAL
BH CV LOWER VASCULAR LEFT DISTAL FEMORAL COMPRESS: NORMAL
BH CV LOWER VASCULAR LEFT GASTRONEMIUS COMPRESS: NORMAL
BH CV LOWER VASCULAR LEFT GASTRONEMIUS THROMBUS: NORMAL
BH CV LOWER VASCULAR LEFT GREATER SAPH AK COMPRESS: NORMAL
BH CV LOWER VASCULAR LEFT LESSER SAPH COMPRESS: NORMAL
BH CV LOWER VASCULAR LEFT MID FEMORAL COMPRESS: NORMAL
BH CV LOWER VASCULAR LEFT POPLITEAL COMPRESS: NORMAL
BH CV LOWER VASCULAR LEFT PROFUNDA FEMORAL COMPRESS: NORMAL
BH CV LOWER VASCULAR LEFT PROXIMAL FEMORAL COMPRESS: NORMAL
BH CV LOWER VASCULAR LEFT SAPHENOFEMORAL JUNCTION COMPRESS: NORMAL
BH CV LOWER VASCULAR LEFT VARICOSITY AK COMPRESS: NORMAL
BH CV LOWER VASCULAR LEFT VARICOSITY BK COMPRESS: NORMAL
BH CV LOWER VASCULAR RIGHT COMMON FEMORAL AUGMENT: NORMAL
BH CV LOWER VASCULAR RIGHT COMMON FEMORAL COMPETENT: NORMAL
BH CV LOWER VASCULAR RIGHT COMMON FEMORAL COMPRESS: NORMAL
BH CV LOWER VASCULAR RIGHT COMMON FEMORAL PHASIC: NORMAL
BH CV LOWER VASCULAR RIGHT COMMON FEMORAL SPONT: NORMAL

## 2025-04-29 PROCEDURE — 99213 OFFICE O/P EST LOW 20 MIN: CPT | Performed by: NURSE PRACTITIONER

## 2025-04-29 PROCEDURE — 3074F SYST BP LT 130 MM HG: CPT | Performed by: NURSE PRACTITIONER

## 2025-04-29 PROCEDURE — 93971 EXTREMITY STUDY: CPT

## 2025-04-29 PROCEDURE — 93971 EXTREMITY STUDY: CPT | Performed by: SURGERY

## 2025-04-29 PROCEDURE — 3078F DIAST BP <80 MM HG: CPT | Performed by: NURSE PRACTITIONER

## 2025-05-12 ENCOUNTER — OFFICE VISIT (OUTPATIENT)
Age: 65
End: 2025-05-12
Payer: MEDICARE

## 2025-05-12 ENCOUNTER — HOSPITAL ENCOUNTER (OUTPATIENT)
Facility: HOSPITAL | Age: 65
Discharge: HOME | End: 2025-05-12
Admitting: NURSE PRACTITIONER
Payer: MEDICARE

## 2025-05-12 VITALS
SYSTOLIC BLOOD PRESSURE: 118 MMHG | HEIGHT: 69 IN | DIASTOLIC BLOOD PRESSURE: 70 MMHG | WEIGHT: 147 LBS | BODY MASS INDEX: 21.77 KG/M2

## 2025-05-12 DIAGNOSIS — I87.2 VENOUS (PERIPHERAL) INSUFFICIENCY: ICD-10-CM

## 2025-05-12 DIAGNOSIS — I83.812 VARICOSE VEINS OF LEFT LOWER EXTREMITY WITH PAIN: ICD-10-CM

## 2025-05-12 DIAGNOSIS — I82.412 DEEP VEIN THROMBOSIS (DVT) OF FEMORAL VEIN OF LEFT LOWER EXTREMITY, UNSPECIFIED CHRONICITY: Primary | ICD-10-CM

## 2025-05-12 PROCEDURE — 93971 EXTREMITY STUDY: CPT

## 2025-05-12 RX ORDER — VORTIOXETINE 20 MG/1
1 TABLET, FILM COATED ORAL DAILY
COMMUNITY
Start: 2025-04-30

## 2025-05-12 RX ORDER — ESTRADIOL 0.1 MG/G
CREAM VAGINAL
COMMUNITY
Start: 2025-01-28

## 2025-05-13 LAB
BH CV LOW VAS LEFT EXTERNAL ILIAC COMPRESS: NORMAL
BH CV LOW VAS LEFT GREATER SAPH AK VESSEL: 1
BH CV LOW VAS LEFT GREATER SAPH BK VESSEL: 1
BH CV LOW VAS LEFT LESSER SAPH VESSEL: 1
BH CV LOW VAS LEFT SAPHENOFEMORAL JUNCTION SPONT: 1
BH CV LOW VAS LEFT VARICOSITY BK VESSEL: 1
BH CV LOWER VASCULAR LEFT ANTERIOR SAPH COMPRESS: NORMAL
BH CV LOWER VASCULAR LEFT ANTERIOR SAPH VESSEL SCRIPT: 1
BH CV LOWER VASCULAR LEFT COMMON FEMORAL COMPRESS: NORMAL
BH CV LOWER VASCULAR LEFT DISTAL FEMORAL COMPRESS: NORMAL
BH CV LOWER VASCULAR LEFT GASTRONEMIUS COMPRESS: NORMAL
BH CV LOWER VASCULAR LEFT GREATER SAPH AK COMPRESS: NORMAL
BH CV LOWER VASCULAR LEFT GREATER SAPH BK COMPRESS: NORMAL
BH CV LOWER VASCULAR LEFT LESSER SAPH COMPRESS: NORMAL
BH CV LOWER VASCULAR LEFT MID FEMORAL COMPRESS: NORMAL
BH CV LOWER VASCULAR LEFT PERFORATOR COMPRESS: NORMAL
BH CV LOWER VASCULAR LEFT PERFORATOR THROMBUS: NORMAL
BH CV LOWER VASCULAR LEFT PERFORATOR VESSEL: 1
BH CV LOWER VASCULAR LEFT PERONEAL COMPRESS: NORMAL
BH CV LOWER VASCULAR LEFT POPLITEAL COMPRESS: NORMAL
BH CV LOWER VASCULAR LEFT POSTERIOR TIBIAL COMPRESS: NORMAL
BH CV LOWER VASCULAR LEFT PROFUNDA FEMORAL COMPRESS: NORMAL
BH CV LOWER VASCULAR LEFT PROXIMAL FEMORAL COMPRESS: NORMAL
BH CV LOWER VASCULAR LEFT SAPHENOFEMORAL JUNCTION COMPRESS: NORMAL
BH CV LOWER VASCULAR LEFT VARICOSITY BK COMPRESS: NORMAL
BH CV LOWER VASCULAR LEFT VARICOSITY BK THROMBUS: NORMAL

## 2025-05-13 NOTE — PROGRESS NOTES
Chief Complaint  POST OP (Tail thrombus) and Deep Vein Thrombosis    Subjective        Brooklyn Mantilla presents to [facility] VASCULAR SURGERY  HPI   Brooklyn Mantilla is a 64 y.o. female that has been followed in our office for venous insufficiency and varicose veins. She under went a Varithena chemical ablation of left proximal great saphenous vein, proximal anterior saphenous vein, small saphenous vein tributary/extension varicose veins off of the truncal veins on the left lower extremity on 4/25/2025 with Dr. Bueno. She returns today in follow up along with a spot check due to tail of thrombus extending from the saphenofemoral junction into the common femoral vein.  Today's scan shows successful closure of the left anterior saphenous vein, greater saphenous vein, short saphenous vein with detail at the saphenofemoral junction flush with the common femoral vein.  No extension into the deep system.  Branch varices and  with closure as well.  All other left sided veins appear to be normal.  She has been doing well since her last visit without any pain.  She has been compliant with compression stockings and using her Eliquis states that she has 2 pills left.  No complaints of any chest pain shortness of breath.    Brooklyn Mantilla  reports that she has never smoked. She has never been exposed to tobacco smoke. She has never used smokeless tobacco..        Objective   Vital Signs:  Vitals:    05/12/25 1601   BP: 118/70      Body mass index is 22.02 kg/m².   BMI is within normal parameters. No other follow-up for BMI required.       Physical Exam  Constitutional:       Appearance: Normal appearance.   HENT:      Head: Normocephalic.   Eyes:      Pupils: Pupils are equal, round, and reactive to light.   Cardiovascular:      Rate and Rhythm: Normal rate and regular rhythm.      Pulses:           Dorsalis pedis pulses are 2+ on the left side.        Posterior tibial pulses are 2+ on the left side.    Pulmonary:      Effort: Pulmonary effort is normal.      Breath sounds: Normal breath sounds.   Abdominal:      General: Abdomen is flat.      Palpations: Abdomen is soft.   Musculoskeletal:         General: Normal range of motion.      Right lower leg: No edema.      Left lower leg: No edema.   Skin:     Capillary Refill: Capillary refill takes less than 2 seconds.   Neurological:      Mental Status: She is alert.   Psychiatric:         Mood and Affect: Mood normal.         Behavior: Behavior normal.          Result Review :      Spot Check: Duplex Venous Lower Extremity - Left CAR (05/12/2025 16:00)          Assessment and Plan     Diagnoses and all orders for this visit:    1. Deep vein thrombosis (DVT) of femoral vein of left lower extremity, unspecified chronicity (Primary)    2. Venous (peripheral) insufficiency  -     Duplex Venous Lower Extremity - Left CAR; Future  -     Duplex Venous Lower Extremity - Left CAR; Future    3. Varicose veins of left lower extremity with pain  -     Duplex Venous Lower Extremity - Left CAR; Future  -     Duplex Venous Lower Extremity - Left CAR; Future              Patient is doing well status post Varithena chemical ablation of the left proximal great saphenous vein, proximal anterior saphenous vein, small saphenous vein and tributary/extension varicose veins of the truncal veins in the left lower extremity.  Today's scan support that the patella that was extending into the saphenofemoral junction is now resolved.  We stopped the Eliquis.  We will plan to follow-up in 12 weeks with a repeat venous duplex and to see the surgeon. She instructed to use her compression hoses as needed such as when traveling and days in which she feels like she will be on legs more than usually.  She will follow up if she has increase swelling and/or pain.  Follow Up     Return in about 3 months (around 8/12/2025) for Left LEV and f/u with [facility].      BINDU Case

## 2025-05-14 DIAGNOSIS — I10 ESSENTIAL HYPERTENSION: Primary | ICD-10-CM

## 2025-05-14 DIAGNOSIS — Z13.220 LIPID SCREENING: ICD-10-CM

## 2025-05-14 DIAGNOSIS — Z13.29 THYROID DISORDER SCREENING: ICD-10-CM

## 2025-06-07 LAB
ALBUMIN SERPL-MCNC: 4.7 G/DL (ref 3.5–5.2)
ALBUMIN/GLOB SERPL: 2 G/DL
ALP SERPL-CCNC: 63 U/L (ref 39–117)
ALT SERPL-CCNC: 30 U/L (ref 1–33)
APPEARANCE UR: CLEAR
AST SERPL-CCNC: 41 U/L (ref 1–32)
BACTERIA #/AREA URNS HPF: ABNORMAL /[HPF]
BACTERIA UR CULT: NO GROWTH
BACTERIA UR CULT: NORMAL
BASOPHILS # BLD AUTO: 0.06 10*3/MM3 (ref 0–0.2)
BASOPHILS NFR BLD AUTO: 1.3 % (ref 0–1.5)
BILIRUB SERPL-MCNC: 0.7 MG/DL (ref 0–1.2)
BILIRUB UR QL STRIP: NEGATIVE
BUN SERPL-MCNC: 13 MG/DL (ref 8–23)
BUN/CREAT SERPL: 16 (ref 7–25)
CALCIUM SERPL-MCNC: 10.2 MG/DL (ref 8.6–10.5)
CASTS URNS QL MICRO: ABNORMAL /LPF
CHLORIDE SERPL-SCNC: 102 MMOL/L (ref 98–107)
CHOLEST SERPL-MCNC: 211 MG/DL (ref 0–200)
CO2 SERPL-SCNC: 25.9 MMOL/L (ref 22–29)
COLOR UR: YELLOW
CREAT SERPL-MCNC: 0.81 MG/DL (ref 0.57–1)
EGFRCR SERPLBLD CKD-EPI 2021: 79.7 ML/MIN/1.73
EOSINOPHIL # BLD AUTO: 0.14 10*3/MM3 (ref 0–0.4)
EOSINOPHIL NFR BLD AUTO: 3 % (ref 0.3–6.2)
EPI CELLS #/AREA URNS HPF: ABNORMAL /HPF (ref 0–10)
ERYTHROCYTE [DISTWIDTH] IN BLOOD BY AUTOMATED COUNT: 12.6 % (ref 12.3–15.4)
GLOBULIN SER CALC-MCNC: 2.3 GM/DL
GLUCOSE SERPL-MCNC: 86 MG/DL (ref 65–99)
GLUCOSE UR QL STRIP: NEGATIVE
HCT VFR BLD AUTO: 37.1 % (ref 34–46.6)
HDLC SERPL-MCNC: 81 MG/DL (ref 40–60)
HGB BLD-MCNC: 12.5 G/DL (ref 12–15.9)
HGB UR QL STRIP: NEGATIVE
IMM GRANULOCYTES # BLD AUTO: 0.01 10*3/MM3 (ref 0–0.05)
IMM GRANULOCYTES NFR BLD AUTO: 0.2 % (ref 0–0.5)
KETONES UR QL STRIP: NEGATIVE
LDLC SERPL CALC-MCNC: 117 MG/DL (ref 0–100)
LEUKOCYTE ESTERASE UR QL STRIP: ABNORMAL
LYMPHOCYTES # BLD AUTO: 2.2 10*3/MM3 (ref 0.7–3.1)
LYMPHOCYTES NFR BLD AUTO: 47.8 % (ref 19.6–45.3)
MCH RBC QN AUTO: 32 PG (ref 26.6–33)
MCHC RBC AUTO-ENTMCNC: 33.7 G/DL (ref 31.5–35.7)
MCV RBC AUTO: 94.9 FL (ref 79–97)
MICRO URNS: ABNORMAL
MONOCYTES # BLD AUTO: 0.42 10*3/MM3 (ref 0.1–0.9)
MONOCYTES NFR BLD AUTO: 9.1 % (ref 5–12)
MUCOUS THREADS URNS QL MICRO: PRESENT
NEUTROPHILS # BLD AUTO: 1.77 10*3/MM3 (ref 1.7–7)
NEUTROPHILS NFR BLD AUTO: 38.6 % (ref 42.7–76)
NITRITE UR QL STRIP: NEGATIVE
NRBC BLD AUTO-RTO: 0 /100 WBC (ref 0–0.2)
PH UR STRIP: 7 [PH] (ref 5–7.5)
PLATELET # BLD AUTO: 287 10*3/MM3 (ref 140–450)
POTASSIUM SERPL-SCNC: 4.8 MMOL/L (ref 3.5–5.2)
PROT SERPL-MCNC: 7 G/DL (ref 6–8.5)
PROT UR QL STRIP: NEGATIVE
RBC # BLD AUTO: 3.91 10*6/MM3 (ref 3.77–5.28)
RBC #/AREA URNS HPF: ABNORMAL /HPF (ref 0–2)
SODIUM SERPL-SCNC: 138 MMOL/L (ref 136–145)
SP GR UR STRIP: 1.01 (ref 1–1.03)
TRIGL SERPL-MCNC: 74 MG/DL (ref 0–150)
TSH SERPL DL<=0.005 MIU/L-ACNC: 1.86 UIU/ML (ref 0.27–4.2)
URINALYSIS REFLEX: ABNORMAL
UROBILINOGEN UR STRIP-MCNC: 0.2 MG/DL (ref 0.2–1)
VLDLC SERPL CALC-MCNC: 13 MG/DL (ref 5–40)
WBC # BLD AUTO: 4.6 10*3/MM3 (ref 3.4–10.8)
WBC #/AREA URNS HPF: ABNORMAL /HPF (ref 0–5)

## 2025-06-08 ENCOUNTER — RESULTS FOLLOW-UP (OUTPATIENT)
Dept: INTERNAL MEDICINE | Facility: CLINIC | Age: 65
End: 2025-06-08
Payer: MEDICARE

## 2025-06-09 NOTE — TELEPHONE ENCOUNTER
Slightly elevated liver enzyme. Would advise avoiding etoh and liver toxic meds.will repeat level at her follow up visit.   ANISA

## 2025-06-10 ENCOUNTER — OFFICE VISIT (OUTPATIENT)
Dept: INTERNAL MEDICINE | Facility: CLINIC | Age: 65
End: 2025-06-10
Payer: MEDICARE

## 2025-06-10 VITALS
HEIGHT: 69 IN | OXYGEN SATURATION: 99 % | HEART RATE: 51 BPM | BODY MASS INDEX: 22.22 KG/M2 | WEIGHT: 150 LBS | DIASTOLIC BLOOD PRESSURE: 74 MMHG | SYSTOLIC BLOOD PRESSURE: 136 MMHG

## 2025-06-10 DIAGNOSIS — R74.01 ELEVATED AST (SGOT): ICD-10-CM

## 2025-06-10 DIAGNOSIS — Z00.00 HEALTHCARE MAINTENANCE: Primary | ICD-10-CM

## 2025-06-10 DIAGNOSIS — R00.2 PALPITATION: ICD-10-CM

## 2025-06-10 DIAGNOSIS — R35.0 URINE FREQUENCY: ICD-10-CM

## 2025-06-10 DIAGNOSIS — I48.0 PAROXYSMAL ATRIAL FIBRILLATION: ICD-10-CM

## 2025-06-10 DIAGNOSIS — I10 ESSENTIAL HYPERTENSION: ICD-10-CM

## 2025-06-10 DIAGNOSIS — I36.1 NONRHEUMATIC TRICUSPID VALVE REGURGITATION: ICD-10-CM

## 2025-06-10 PROCEDURE — 3075F SYST BP GE 130 - 139MM HG: CPT | Performed by: INTERNAL MEDICINE

## 2025-06-10 PROCEDURE — 1170F FXNL STATUS ASSESSED: CPT | Performed by: INTERNAL MEDICINE

## 2025-06-10 PROCEDURE — 1125F AMNT PAIN NOTED PAIN PRSNT: CPT | Performed by: INTERNAL MEDICINE

## 2025-06-10 PROCEDURE — 93000 ELECTROCARDIOGRAM COMPLETE: CPT | Performed by: INTERNAL MEDICINE

## 2025-06-10 PROCEDURE — G0439 PPPS, SUBSEQ VISIT: HCPCS | Performed by: INTERNAL MEDICINE

## 2025-06-10 PROCEDURE — 3078F DIAST BP <80 MM HG: CPT | Performed by: INTERNAL MEDICINE

## 2025-06-10 NOTE — PROGRESS NOTES
Subjective   The ABCs of the Annual Wellness Visit  Medicare Wellness Visit      Brooklyn Mantilla is a 65 y.o. patient who presents for a Medicare Wellness Visit.    The following portions of the patient's history were reviewed and   updated as appropriate: allergies, current medications, past family history, past medical history, past social history, past surgical history, and problem list.    Compared to one year ago, the patient's physical   health is the same.  Compared to one year ago, the patient's mental   health is the same.    Recent Hospitalizations:  She was not admitted to the hospital during the last year.     Current Medical Providers:  Patient Care Team:  Cathy Pina MD as PCP - General (Internal Medicine)  Hannah Kelley MD as Consulting Physician (Obstetrics and Gynecology)    Outpatient Medications Prior to Visit   Medication Sig Dispense Refill    estradiol (ESTRACE) 0.1 MG/GM vaginal cream INSERT 1 APPLICATORFUL VAGINALLY TWICE WEEKLY      hydroCHLOROthiazide 12.5 MG tablet TAKE 1 TABLET BY MOUTH DAILY 100 tablet 2    MAGNESIUM PO Take 1 tablet by mouth Daily.      metoprolol tartrate (LOPRESSOR) 25 MG tablet Take 1 tablet by mouth 2 (Two) Times a Day. 180 tablet 3    Multiple Vitamins-Minerals (MULTIVITAMIN ADULT PO) Take 1 tablet by mouth Daily.      olmesartan (BENICAR) 40 MG tablet TAKE 1 TABLET BY MOUTH EVERY DAY 90 tablet 3    Trintellix 20 MG tablet Take 1 tablet by mouth Daily.      Premarin 0.625 MG/GM vaginal cream INSERT 1 G INTO THE VAGINA EVERY 7 (SEVEN) DAYS. 30 g 0    apixaban (ELIQUIS) 5 MG tablet tablet Take 1 tablet by mouth 2 (Two) Times a Day. (Patient not taking: Reported on 6/10/2025)      vilazodone (Viibryd) 20 MG tablet tablet Take 1 tablet by mouth Daily. (Patient not taking: Reported on 6/10/2025) 90 tablet 3     No facility-administered medications prior to visit.     No opioid medication identified on active medication list. I have reviewed chart for other  "potential  high risk medication/s and harmful drug interactions in the elderly.      Aspirin is not on active medication list.  Aspirin use is not indicated based on review of current medical condition/s. Risk of harm outweighs potential benefits.  .    Patient Active Problem List   Diagnosis    Essential hypertension    Healthcare maintenance    Palpitations    Metatarsalgia of left foot    Hallux valgus of left foot    Hammer toe of left foot    Arthritis of first metatarsophalangeal (MTP) joint of left foot    Bursitis of intermetatarsal bursa of left foot    Venous (peripheral) insufficiency    Varicose veins of left lower extremity with pain     Advance Care Planning Advance Directive is not on file.  ACP discussion was held with the patient during this visit. Patient does not have an advance directive, information provided.            Objective   Vitals:    06/10/25 1402   BP: 136/74   Pulse: 51   SpO2: 99%   Weight: 68 kg (150 lb)   Height: 174 cm (68.5\")   PainSc: 2        Estimated body mass index is 22.48 kg/m² as calculated from the following:    Height as of this encounter: 174 cm (68.5\").    Weight as of this encounter: 68 kg (150 lb).    BMI is within normal parameters. No other follow-up for BMI required.           Does the patient have evidence of cognitive impairment? No  Lab Results   Component Value Date    CHLPL 211 (H) 06/05/2025    TRIG 74 06/05/2025    HDL 81 (H) 06/05/2025     (H) 06/05/2025    VLDL 13 06/05/2025                                                                                                Health  Risk Assessment    Smoking Status:  Social History     Tobacco Use   Smoking Status Never    Passive exposure: Never   Smokeless Tobacco Never   Tobacco Comments    Daily caffeine use     Alcohol Consumption:  Social History     Substance and Sexual Activity   Alcohol Use Yes    Comment: 4-5 times a week       Fall Risk Screen  STEADI Fall Risk Assessment was completed, and " patient is at LOW risk for falls.Assessment completed on:6/10/2025    Depression Screening   Little interest or pleasure in doing things? Not at all   Feeling down, depressed, or hopeless? Not at all   PHQ-2 Total Score 0      Health Habits and Functional and Cognitive Screenin/10/2025     2:06 PM   Functional & Cognitive Status   Do you have difficulty preparing food and eating? No   Do you have difficulty bathing yourself, getting dressed or grooming yourself? No   Do you have difficulty using the toilet? No   Do you have difficulty moving around from place to place? No   Do you have trouble with steps or getting out of a bed or a chair? No   Current Diet Well Balanced Diet   Dental Exam Up to date   Eye Exam Up to date   Do you need help using the phone?  No   Are you deaf or do you have serious difficulty hearing?  No   Do you need help to go to places out of walking distance? No   Do you need help shopping? No   Do you need help preparing meals?  No   Do you need help with housework?  No   Do you need help with laundry? No   Do you need help taking your medications? No   Do you need help managing money? No   Do you ever drive or ride in a car without wearing a seat belt? No   Have you felt unusual stress, anger or loneliness in the last month? No   Who do you live with? Spouse   If you need help, do you have trouble finding someone available to you? No   Have you been bothered in the last four weeks by sexual problems? No   Do you have difficulty concentrating, remembering or making decisions? No           Age-appropriate Screening Schedule:  Refer to the list below for future screening recommendations based on patient's age, sex and/or medical conditions. Orders for these recommended tests are listed in the plan section. The patient has been provided with a written plan.    Health Maintenance List  Health Maintenance   Topic Date Due    COVID-19 Vaccine (3 - 2024-25 season) 12/10/2025 (Originally  "9/1/2024)    INFLUENZA VACCINE  07/01/2025    DXA SCAN  01/19/2026    ANNUAL WELLNESS VISIT  06/10/2026    MAMMOGRAM  12/10/2026    TDAP/TD VACCINES (3 - Td or Tdap) 10/09/2028    COLORECTAL CANCER SCREENING  02/27/2033    HEPATITIS C SCREENING  Completed    Pneumococcal Vaccine 50+  Completed    ZOSTER VACCINE  Completed                                                                                                                                                CMS Preventative Services Quick Reference  Risk Factors Identified During Encounter  Utd on hcm    The above risks/problems have been discussed with the patient.  Pertinent information has been shared with the patient in the After Visit Summary.  An After Visit Summary and PPPS were made available to the patient.    Follow Up:   Next Medicare Wellness visit to be scheduled in 1 year.         Additional E&M Note during same encounter follows:  Patient has additional, significant, and separately identifiable condition(s)/problem(s) that require work above and beyond the Medicare Wellness Visit     Chief Complaint  Annual Exam  Atrial fibrillation  Hypertension    Subjective   HPI  Brooklyn is also being seen today for an annual adult preventative physical exam.  and Brooklyn is also being seen today for additional medical problem/s.she has htn. Bp at home normotensive. H/o afib. Last visit w cards 2020. Some recent palpitations when playing tennis recently. May have gotten dehydrated. Has nxious and depressed mood. Doing well on trintelix at tthis time. Has a therapist not currently attending.                   Objective   Vital Signs:  /74   Pulse 51   Ht 174 cm (68.5\")   Wt 68 kg (150 lb)   SpO2 99%   BMI 22.48 kg/m²   Physical Exam  Vitals and nursing note reviewed.   Constitutional:       Appearance: She is well-developed.   HENT:      Head: Normocephalic and atraumatic.      Right Ear: External ear normal.      Left Ear: External ear normal.      " Nose: Nose normal.   Eyes:      Pupils: Pupils are equal, round, and reactive to light.   Cardiovascular:      Rate and Rhythm: Normal rate and regular rhythm.      Heart sounds: Normal heart sounds.   Pulmonary:      Effort: Pulmonary effort is normal. No respiratory distress.      Breath sounds: Normal breath sounds.   Abdominal:      Palpations: Abdomen is soft.   Musculoskeletal:         General: Normal range of motion.      Cervical back: Neck supple.   Skin:     General: Skin is warm and dry.   Neurological:      Mental Status: She is alert and oriented to person, place, and time.   Psychiatric:         Mood and Affect: Mood normal.         Behavior: Behavior normal.         Thought Content: Thought content normal.         Judgment: Judgment normal.     EKG for palpitation. Sinus bradycardia. Nonspec st changes. Similar to prior tracings.     The following data was reviewed by: Cathy Pina MD on 06/10/2025:  Data reviewed : Cardiology studies stress and echo  Common labs          9/3/2024    11:14 12/2/2024    09:50 6/5/2025    11:19   Common Labs   Glucose  91  86    BUN  14  13.0    Creatinine  0.79  0.81    Sodium  135  138    Potassium  4.9  4.8    Chloride  99  102    Calcium  9.7  10.2    Albumin  4.2  4.7    Total Bilirubin  0.5  0.7    Alkaline Phosphatase  52  63    AST (SGOT)  29  41    ALT (SGPT)  30  30    WBC 7.26     6.3  4.60    Hemoglobin 12.9     13.8  12.5    Hematocrit 38.9     42.4  37.1    Platelets 317     372  287    Total Cholesterol  204  211    Triglycerides  82  74    HDL Cholesterol  74  81    LDL Cholesterol   115  117       Details          This result is from an external source.                  Assessment and Plan Additional age appropriate preventative wellness advice topics were discussed during today's preventative wellness exam(some topics already addressed during AWV portion of the note above):    Physical Activity: Advised cardiovascular activity 150 minutes per week  as tolerated. (example brisk walk for 30 minutes, 5 days a week).     Nutrition: Discussed nutrition plan with patient. Information shared in after visit summary. Goal is for a well balanced diet to enhance overall health.     Healthy Weight: Discussed current and goal BMI with patient. Steps to attain this goal discussed. Information shared in after visit summary.     Injury Prevention Discussion:  Information shared in after visit summary.           Palpitation    Orders:    ECG 12 Lead    Ambulatory Referral to Cardiology for Atrial Fibrillation    Adult Transthoracic Echo Complete W/ Cont if Necessary Per Protocol; Future    Paroxysmal atrial fibrillation  Sinus at this time. She has not been on anticoagulant and ? When most recent afib occurred. Will get holter, echo, and f/u w cards.   Orders:    Adult Transthoracic Echo Complete W/ Cont if Necessary Per Protocol; Future    Nonrheumatic tricuspid valve regurgitation    Orders:    Adult Transthoracic Echo Complete W/ Cont if Necessary Per Protocol; Future    Healthcare maintenance         Essential hypertension  She is to get a monitor and track bp. Continue current. Avoid hypotension.          Elevated AST (SGOT)    Orders:    AST; Future    Urine frequency    Orders:    UA / M With / Rflx Culture([facility] ONLY) - Urine, Clean Catch          I spent 48 minutes caring for Brooklyn on this date of service. This time includes time spent by me in the following activities:preparing for the visit, reviewing tests, obtaining and/or reviewing a separately obtained history, performing a medically appropriate examination and/or evaluation , counseling and educating the patient/family/caregiver, ordering medications, tests, or procedures, referring and communicating with other health care professionals , documenting information in the medical record, and independently interpreting results and communicating that information with the patient/family/caregiver  Follow Up   No  follow-ups on file.  Patient was given instructions and counseling regarding her condition or for health maintenance advice. Please see specific information pulled into the AVS if appropriate.

## 2025-06-12 LAB
APPEARANCE UR: CLEAR
BACTERIA #/AREA URNS HPF: NORMAL /[HPF]
BACTERIA UR CULT: NO GROWTH
BACTERIA UR CULT: NORMAL
BILIRUB UR QL STRIP: NEGATIVE
CASTS URNS QL MICRO: NORMAL /LPF
COLOR UR: YELLOW
EPI CELLS #/AREA URNS HPF: NORMAL /HPF (ref 0–10)
GLUCOSE UR QL STRIP: NEGATIVE
HGB UR QL STRIP: NEGATIVE
KETONES UR QL STRIP: NEGATIVE
LEUKOCYTE ESTERASE UR QL STRIP: ABNORMAL
MICRO URNS: ABNORMAL
NITRITE UR QL STRIP: NEGATIVE
PH UR STRIP: 6.5 [PH] (ref 5–7.5)
PROT UR QL STRIP: NEGATIVE
RBC #/AREA URNS HPF: NORMAL /HPF (ref 0–2)
SP GR UR STRIP: 1.02 (ref 1–1.03)
URINALYSIS REFLEX: ABNORMAL
UROBILINOGEN UR STRIP-MCNC: 0.2 MG/DL (ref 0.2–1)
WBC #/AREA URNS HPF: NORMAL /HPF (ref 0–5)

## 2025-07-28 RX ORDER — METOPROLOL TARTRATE 25 MG/1
25 TABLET, FILM COATED ORAL 2 TIMES DAILY
Qty: 180 TABLET | Refills: 1 | Status: SHIPPED | OUTPATIENT
Start: 2025-07-28

## 2025-08-12 ENCOUNTER — HOSPITAL ENCOUNTER (OUTPATIENT)
Facility: HOSPITAL | Age: 65
Discharge: HOME | End: 2025-08-12
Admitting: NURSE PRACTITIONER
Payer: MEDICARE

## 2025-08-12 DIAGNOSIS — I87.2 VENOUS (PERIPHERAL) INSUFFICIENCY: ICD-10-CM

## 2025-08-12 DIAGNOSIS — I83.812 VARICOSE VEINS OF LEFT LOWER EXTREMITY WITH PAIN: ICD-10-CM

## 2025-08-12 LAB
BH CV LOW VAS LEFT EXTERNAL ILIAC AUGMENT: NORMAL
BH CV LOW VAS LEFT EXTERNAL ILIAC COMPRESS: NORMAL
BH CV LOW VAS LEFT GREATER SAPH AK VESSEL: 1
BH CV LOW VAS LEFT GREATER SAPH BK VESSEL: 1
BH CV LOW VAS LEFT LESSER SAPH VESSEL: 1
BH CV LOWER VASCULAR LEFT ANTERIOR SAPH COMPRESS: NORMAL
BH CV LOWER VASCULAR LEFT ANTERIOR SAPH VESSEL SCRIPT: 1
BH CV LOWER VASCULAR LEFT COMMON FEMORAL AUGMENT: NORMAL
BH CV LOWER VASCULAR LEFT COMMON FEMORAL COMPETENT: NORMAL
BH CV LOWER VASCULAR LEFT COMMON FEMORAL COMPRESS: NORMAL
BH CV LOWER VASCULAR LEFT COMMON FEMORAL PHASIC: NORMAL
BH CV LOWER VASCULAR LEFT COMMON FEMORAL SPONT: NORMAL
BH CV LOWER VASCULAR LEFT DISTAL FEMORAL COMPRESS: NORMAL
BH CV LOWER VASCULAR LEFT EXTERNAL ILIAC COMPETENT: NORMAL
BH CV LOWER VASCULAR LEFT EXTERNAL ILIAC PHASIC: NORMAL
BH CV LOWER VASCULAR LEFT EXTERNAL ILIAC SPONT: NORMAL
BH CV LOWER VASCULAR LEFT GASTRONEMIUS COMPRESS: NORMAL
BH CV LOWER VASCULAR LEFT GREATER SAPH AK COMPRESS: NORMAL
BH CV LOWER VASCULAR LEFT GREATER SAPH BK COMPRESS: NORMAL
BH CV LOWER VASCULAR LEFT LESSER SAPH COMPRESS: NORMAL
BH CV LOWER VASCULAR LEFT MID FEMORAL AUGMENT: NORMAL
BH CV LOWER VASCULAR LEFT MID FEMORAL COMPETENT: NORMAL
BH CV LOWER VASCULAR LEFT MID FEMORAL COMPRESS: NORMAL
BH CV LOWER VASCULAR LEFT PERONEAL AUGMENT: NORMAL
BH CV LOWER VASCULAR LEFT PERONEAL COMPRESS: NORMAL
BH CV LOWER VASCULAR LEFT POPLITEAL AUGMENT: NORMAL
BH CV LOWER VASCULAR LEFT POPLITEAL COMPETENT: NORMAL
BH CV LOWER VASCULAR LEFT POPLITEAL COMPRESS: NORMAL
BH CV LOWER VASCULAR LEFT POSTERIOR TIBIAL AUGMENT: NORMAL
BH CV LOWER VASCULAR LEFT POSTERIOR TIBIAL COMPRESS: NORMAL
BH CV LOWER VASCULAR LEFT PROFUNDA FEMORAL COMPRESS: NORMAL
BH CV LOWER VASCULAR LEFT PROXIMAL FEMORAL COMPRESS: NORMAL
BH CV LOWER VASCULAR LEFT SAPHENOFEMORAL JUNCTION COMPRESS: NORMAL
BH CV LOWER VASCULAR RIGHT COMMON FEMORAL AUGMENT: NORMAL
BH CV LOWER VASCULAR RIGHT COMMON FEMORAL COMPETENT: NORMAL
BH CV LOWER VASCULAR RIGHT COMMON FEMORAL COMPRESS: NORMAL
BH CV LOWER VASCULAR RIGHT COMMON FEMORAL PHASIC: NORMAL
BH CV LOWER VASCULAR RIGHT COMMON FEMORAL SPONT: NORMAL

## 2025-08-12 PROCEDURE — 93971 EXTREMITY STUDY: CPT
